# Patient Record
Sex: FEMALE | Race: WHITE | NOT HISPANIC OR LATINO | Employment: UNEMPLOYED | URBAN - METROPOLITAN AREA
[De-identification: names, ages, dates, MRNs, and addresses within clinical notes are randomized per-mention and may not be internally consistent; named-entity substitution may affect disease eponyms.]

---

## 2017-08-31 LAB — AFP-TUMOR MARKER: 3

## 2017-09-05 ENCOUNTER — TELEPHONE (OUTPATIENT)
Dept: HEPATOLOGY | Facility: CLINIC | Age: 62
End: 2017-09-05

## 2017-09-05 NOTE — TELEPHONE ENCOUNTER
----- Message from Adore Mason MD sent at 9/2/2017  7:17 PM CDT -----  Please inform patient results are OK.

## 2017-09-25 ENCOUNTER — TELEPHONE (OUTPATIENT)
Dept: HEPATOLOGY | Facility: CLINIC | Age: 62
End: 2017-09-25

## 2017-09-25 NOTE — TELEPHONE ENCOUNTER
Reviewed report of ultrasound of the abdomen dated August 29, 2017:  Mild diffuse fatty infiltration of the liver.  The approximately 2.4 cm echogenic lesion in the left hepatic lobe grossly unchanged from the previous exam on although measurements are slightly smaller most likely due to visualization of the lesion.  Right renal cyst.    Please inform patient ultrasound of the abdomen showed 2.4 cm lesion, stable compared to CT scan in December 2013.  This finding supports benign nature of the lesion.